# Patient Record
Sex: FEMALE | Race: WHITE
[De-identification: names, ages, dates, MRNs, and addresses within clinical notes are randomized per-mention and may not be internally consistent; named-entity substitution may affect disease eponyms.]

---

## 2020-03-30 ENCOUNTER — HOSPITAL ENCOUNTER (EMERGENCY)
Dept: HOSPITAL 11 - JP.ED | Age: 45
Discharge: HOME | End: 2020-03-30
Payer: MEDICAID

## 2020-03-30 DIAGNOSIS — E03.9: ICD-10-CM
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
## 2020-03-30 DIAGNOSIS — Z79.899: ICD-10-CM

## 2020-03-30 DIAGNOSIS — S92.012A: Primary | ICD-10-CM

## 2020-03-30 PROCEDURE — 73700 CT LOWER EXTREMITY W/O DYE: CPT

## 2020-03-30 PROCEDURE — 99283 EMERGENCY DEPT VISIT LOW MDM: CPT

## 2020-03-30 PROCEDURE — 73610 X-RAY EXAM OF ANKLE: CPT

## 2020-03-30 PROCEDURE — 96372 THER/PROPH/DIAG INJ SC/IM: CPT

## 2020-03-30 PROCEDURE — 29515 APPLICATION SHORT LEG SPLINT: CPT

## 2020-03-30 NOTE — CR
Ankle Min 3V Lt

 

CLINICAL HISTORY: Fall, injury

 

FINDINGS: There is a minimally displaced fracture in the coronal plane through

the posterior third of the calcaneus with some flattening of the angle. Talus

appears intact. Ankle mortise is symmetric.

 

Impression: Calcaneal fracture

## 2020-03-30 NOTE — CRLCT
HISTORY:



Fall. Calcaneus fracture.



TECHNIQUE:



CT left foot without contrast.



COMPARISON:



Radiographs same day.



FINDINGS:



Acute comminuted fracture of the calcaneus. Fracture involves the posterior 

facet with a sagittal fracture plane dividing the posterior facet into 

medial and lateral halves. Fragment comprising the lateral half of the 

posterior facet is impacted and rotated. Relationship between the medial 

aspect of the posterior facet and the posterior facet of the talus is 

maintained. Fracture involves the anterior aspect of the sustentaculum 

rod. Middle facet articular surface is intact. Up to 7 mm medial to 

lateral displacement between the major calcaneal tuberosity fragment and 

the fragment containing the medial half of the posterior facet in the 

sustentaculum rod (axial series 3, image 50). Fracture extends obliquely 

through the anterior aspect of the calcaneus extending just lateral to the 

articular surface for the cuboid. Articular surface for the cuboid is 

intact. No talus fracture. No fracture in the midfoot or forefoot. Ankle 

mortise is congruent. No dislocation. Moderate osteoarthritis of the 1st 

MTP joint. Severe degenerative changes at the articulations of the hallux 

sesamoids and 1st metatarsal. Acute hemorrhage and small amount of 

extraosseous marrow fat in the flexor hallucis longus tendon sheath. Fat 

stranding in the hindfoot and midfoot.



IMPRESSION:



1. Acute comminuted, displaced, and depressed fracture of the calcaneus. 



2. Moderate osteoarthritis of the 1st MTP joint. Severe degenerative 

changes at the articulations of the hallux sesamoids and 1st metatarsal.



Please note that all CT scans at this facility use dose modulation, 

iterative reconstruction, and/or weight-based dosing when appropriate to 

reduce radiation dose to as low as reasonably achievable.



Dictated by Javier Eisenberg MD @ Mar 30 2020  3:46PM



Signed by Dr. Javier Eisenberg @ Mar 30 2020  4:03PM

## 2020-03-30 NOTE — EDM.PDOC
ED HPI GENERAL MEDICAL PROBLEM





- General


Chief Complaint: Lower Extremity Injury/Pain


Stated Complaint: LEFT ANKLE INJURY


Time Seen by Provider: 03/30/20 13:35


Source of Information: Reports: Patient, Family, RN Notes Reviewed


History Limitations: Reports: No Limitations





- History of Present Illness


INITIAL COMMENTS - FREE TEXT/NARRATIVE: 





44-year-old female presents emergency department today with a left ankle injury

, she injured herself last night when she accidentally fell off a stepstool she 

is unsure of the mechanism of action she cannot bear weight she treated with 

ice right away but she does have edema as well as ecchymosis around the ankle


  ** Left Ankle


Pain Score (Numeric/FACES): 4





- Related Data


 Allergies











Allergy/AdvReac Type Severity Reaction Status Date / Time


 


No Known Allergies Allergy   Verified 03/30/20 13:16











Home Meds: 


 Home Meds





ClonazePAM [KlonoPIN] 0.5 mg PO ASDIRECTED PRN 03/30/20 [History]


Levothyroxine [Synthroid] 50 mcg PO DAILY 03/30/20 [History]


Sertraline [Zoloft] 150 mg PO DAILY 03/30/20 [History]


buPROPion [Wellbutrin] 100 mg PO BEDTIME 03/30/20 [History]


buPROPion [Wellbutrin] 200 mg PO DAILY 03/30/20 [History]











Past Medical History


Cardiovascular History: Reports: High Cholesterol


OB/GYN History: Reports: Pregnancy


Other OB/GYN History: oviarian polyp removed


Endocrine/Metabolic History: Reports: Hypothyroidism





- Past Surgical History


Oncologic Surgical History: Reports: Lumpectomy





Social & Family History





- Tobacco Use


Smoking Status *Q: Never Smoker





- Caffeine Use


Caffeine Use: Reports: Coffee





- Recreational Drug Use


Recreational Drug Use: No





Review of Systems





- Review of Systems


Review Of Systems: See Below


Constitutional: Reports: No Symptoms


Musculoskeletal: Reports: Joint Pain (Ankle pain)


Skin: Reports: Bruising





ED EXAM, GENERAL





- Physical Exam


Exam: See Below


Free Text/Narrative:: 





Examination of the left ankle I do appreciate significant edema predominantly 

on the lateral malleolus there is bruising around the lateral malleolus she 

will not tolerate much of an exam secondary to pain pedal pulses +2 she cannot 

bear weight





Course





- Vital Signs


Last Recorded V/S: 


 Last Vital Signs











Temp  98.2 F   03/30/20 13:25


 


Pulse  78   03/30/20 13:15


 


Resp  17   03/30/20 13:15


 


BP  145/91 H  03/30/20 13:15


 


Pulse Ox  96   03/30/20 13:15














- Orders/Labs/Meds


Meds: 


Medications














Discontinued Medications














Generic Name Dose Route Start Last Admin





  Trade Name Lesly  PRN Reason Stop Dose Admin


 


Fentanyl  50 mcg  03/30/20 14:48  03/30/20 14:51





  Sublimaze  IM  03/30/20 14:49  50 mcg





  ONETIME ONE   Administration





     





     





     





     


 


Ketorolac Tromethamine  60 mg  03/30/20 13:37  03/30/20 13:41





  Toradol  IM  03/30/20 13:38  60 mg





  ONETIME ONE   Administration





     





     





     





     














- Re-Assessments/Exams


Free Text/Narrative Re-Assessment/Exam: 





03/30/20 14:49


Discussed the case with Dr. Leon at 1440 recommend CT scan posterior 

splint with extra padding crutches follow-up in his clinic in 1 week





Departure





- Departure


Time of Disposition: 16:25


Disposition: Home, Self-Care 01


Condition: Fair


Clinical Impression: 


Calcaneus fracture, left


Qualifiers:


 Encounter type: initial encounter Calcaneus location: body Fracture type: 

closed Fracture alignment: displaced Qualified Code(s): S92.012A - Displaced 

fracture of body of left calcaneus, initial encounter for closed fracture








- Discharge Information


Referrals: 


PCP,None [Primary Care Provider] - 


Forms:  ED Department Discharge


Additional Instructions: 


Please call to the Ortho clinic tomorrow morning for an appointment time in 1 

week with Dr. Leon use of Percocet as needed for pain control continue to 

use the crutches and nonweightbearing





Sepsis Event Note





- Evaluation


Sepsis Screening Result: No Definite Risk





- Focused Exam


Vital Signs: 


 Vital Signs











  Temp Pulse Resp BP Pulse Ox


 


 03/30/20 13:25  98.2 F    


 


 03/30/20 13:15   78  17  145/91 H  96


 


 03/30/20 13:11   78  17  145/91 H  96











Date Exam was Performed: 03/30/20


Time Exam was Performed: 16:23





- Assessment/Plan


Plan: 





Assessment





Acuity = acute





Site and laterality = left calcaneus fracture closed





Etiology  = secondary to trauma





Manifestations = none





Location of injury =  Home





Lab values = CT he describes a fracture as well as plain film it is comminuted 

displaced and depressed





Plan


Discussed case Dr. Rodriges he will see her in 1 week she is placed in a 

posterior splint and crutches Percocet 5/325 1 tab p.o. 3 times daily PRN total 

#20

















 This note was dictated using dragon voice recognition software please call 

with any questions on syntax or grammar.

## 2020-04-15 ENCOUNTER — HOSPITAL ENCOUNTER (OUTPATIENT)
Dept: HOSPITAL 11 - JP.SDS | Age: 45
Discharge: HOME | End: 2020-04-15
Attending: ORTHOPAEDIC SURGERY
Payer: MEDICAID

## 2020-04-15 DIAGNOSIS — E78.00: ICD-10-CM

## 2020-04-15 DIAGNOSIS — F41.9: ICD-10-CM
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
## 2020-04-15 DIAGNOSIS — S92.062A: Primary | ICD-10-CM

## 2020-04-15 DIAGNOSIS — Z79.899: ICD-10-CM

## 2020-04-15 DIAGNOSIS — E03.9: ICD-10-CM

## 2020-04-15 PROCEDURE — C1713 ANCHOR/SCREW BN/BN,TIS/BN: HCPCS

## 2020-04-15 NOTE — OR
DATE OF PROCEDURE:  04/15/2020

 

SURGEON:  Rivera Leon MD

 

PREOPERATIVE DIAGNOSIS:  Comminuted intra-articular left calcaneus fracture.

 

POSTOPERATIVE DIAGNOSIS:  Comminuted displaced intra-articular left calcaneus 
fracture.

 

PROCEDURE:  Open reduction and internal fixation of left calcaneus.

 

ANESTHESIA:  General.

 

INDICATIONS:  Rita is a 44-year-old female who sustained a fall directly onto 
her left heel

resulting in an intra-articular fracture with depression and displacement.  She 
now presents

for open reduction and internal fixation.  It has been approximately 2 weeks 
since the

injury and her swelling has subsided significantly.  Risks, benefits, and 
potential

complications of the procedure were discussed with the patient and her .

 

DESCRIPTION OF PROCEDURE:  After adequate anesthesia was obtained, the patient 
was placed in

lateral decubitus position and secured with a bean-bag positioner.  Tourniquet 
was placed

about the left leg.  Left leg was then prepped and draped in a sterile fashion.
  Leg was

held in elevation and tourniquet inflated to 300 mmHg pressure.  Extensile 
lateral incision

was planned and carried out sharply dissecting directly down onto bone of the 
calcaneus and

raising a full-thickness subperiosteal flap.  Peroneal tendons were identified.
  P[place]ley was

divided to allow mobilization.  Once the flap was mobilized, a K-wire was 
advanced into the

shaft of the fibula and bent for a retractor.  A 2nd K-wire was placed into the 
talar neck

and again used for retractor.  Fracture of the lateral wall was identified.  
This fragment

was elevated off the depressed articular fragment and kept moist on the back 
table.  The

depressed articular fragment was significantly impacted and took a bit of work 
to free it to

allow reduction.  A Shantz screw was placed into the posterior aspect of the 
tuberosity and

used to manipulate the fracture.  Traction was applied.  [place] elevator was 
placed across

the fracture into the medial wall and with traction and mobilization with the 
Schanz screw

as a joystick, reduction of the medial wall was attempted.  The medial 
fragments still overlapped so a lamina  was used to obtain length. A K-
wire was than placed along the medial side.  The

articular fragment was then reduced into position and held with a K-wire and 
position was

confirmed using fluoroscopy.  Once the height and position of the fracture was 
re-

established, a significant cavity was left within the body, and this was packed 
with crushed

cancellous bone.  The lateral wall fragment was then replaced and a calcaneal 
plate was

selected.  This was pinned in position and the position was confirmed with 
fluoroscopy.

This was then secured with the 2.7 mm locking screws.  Once fixation had been 
obtained, the

wound was irrigated.  The retracting pins were removed along with the temporary 
K-wires for

fixation.  The flap was then repaired full-thickness with 0 Vicryl in 
interrupted fashion

using pop-off sutures.  Once all sutures had been placed with good bite of the

periosteum, the sutures were tied, and this provided excellent approximation of 
the flap.  A

running 3-0 Monocryl was then placed subcuticularly.  Steri-Strips were 
applied.  Wound was

infiltrated with Marcaine and a sterile dressing was placed.

The patient was then placed into a well-padded plaster AO splint in neutral 
position.  She

tolerated the procedure well.  There were no complications.  She was taken from 
the

operating room in stable condition.

 

 

 

 

Rivera Leon MD

DD:  04/15/2020 15:43:40

DT:  04/15/2020 18:20:26

Job #:  [id number]

[email or web address]

## 2021-01-11 ENCOUNTER — HOSPITAL ENCOUNTER (OUTPATIENT)
Dept: HOSPITAL 11 - JP.SDS | Age: 46
Discharge: HOME | End: 2021-01-11
Attending: ORTHOPAEDIC SURGERY
Payer: MEDICAID

## 2021-01-11 DIAGNOSIS — T84.84XA: ICD-10-CM

## 2021-01-11 DIAGNOSIS — M19.172: Primary | ICD-10-CM

## 2021-01-11 PROCEDURE — 85027 COMPLETE CBC AUTOMATED: CPT

## 2021-01-11 PROCEDURE — 80053 COMPREHEN METABOLIC PANEL: CPT

## 2021-01-11 PROCEDURE — C1713 ANCHOR/SCREW BN/BN,TIS/BN: HCPCS

## 2021-01-11 PROCEDURE — 76000 FLUOROSCOPY <1 HR PHYS/QHP: CPT

## 2021-01-11 PROCEDURE — 81025 URINE PREGNANCY TEST: CPT

## 2021-01-11 PROCEDURE — 36415 COLL VENOUS BLD VENIPUNCTURE: CPT

## 2021-01-11 PROCEDURE — C1769 GUIDE WIRE: HCPCS

## 2021-01-11 PROCEDURE — 28725 ARTHRODESIS SUBTALAR: CPT

## 2021-01-11 PROCEDURE — 20680 REMOVAL OF IMPLANT DEEP: CPT

## 2021-01-25 NOTE — OR
DATE OF PROCEDURE:  01/11/2021

 

SURGEON:  Rivera Leon MD

 

PREOPERATIVE DIAGNOSES:

1. Painful hardware, left calcaneus.

2. Post-traumatic arthritis, subtalar joint.

 

POSTOPERATIVE DIAGNOSES:

1. Painful hardware, left calcaneus.

2. Post-traumatic arthritis, subtalar joint.

 

PROCEDURES:

1. Removal of hardware, left calcaneus.

2. Subtalar arthrodesis.

 

ASSISTANT:  WES Cazares

 

ANESTHESIA:  General.

 

INDICATIONS:  Rita is a 45-year-old female with a history of comminuted 
displaced intra-

articular fracture of the left calcaneus.  She previously underwent open 
reduction and

internal fixation.  She has gone on to heal the fracture and is having 
persistent pain in

the lateral aspect of the heel and calcaneus as well as with weightbearing.  
Some

impingement of the peroneal tendons and tenderness over the hardware.  She 
wishes to have

the hardware removed.  We had a discussion regarding post-traumatic arthritis 
and the need for

subtalar arthrodesis, which would be likely at some point in the future if she 
did not

proceed with it now.  Due to the evidence of some post-traumatic changes, pain 
with

weightbearing and increased likelihood of surgical wound complications with 
multiple

surgeries in this area.  A decision was made to proceed with the arthrodesis at 
this time.

Risks, benefits, potential complications were discussed.

 

DESCRIPTION OF PROCEDURE:  After adequate anesthesia was obtained, a tourniquet 
was placed

about the left upper thigh.  Left leg was prepped and draped in a sterile 
fashion with the

patient in a lateral decubitus position and secured with the beanbag positioner.
 Leg was

prepped and draped in a sterile fashion.  Exsanguinated and tourniquet inflated 
to 300 mmHg

pressure.  The patient had a fairly significant keloid of the scar in the 
lateral aspect of

the heel and this was excised.  Incision carried down to the calcaneus and a 
full-thickness

flap was elevated exposing the calcaneal plate.  Plate was cleared of scar 
tissue and screws

were removed without difficulty.  The plate was then removed.  A K-wire was 
placed into the

fibula and bent and used as a retractor.  A 2nd K-wire was placed into the talar
neck, again

bent and used as retraction.  Subtalar joint was identified.  Capsule was 
excised sharply

and using a combination of curettes, rongeurs, and osteotomes, articular 
cartilage was

removed from both surfaces of the subtalar joint.  It was then thoroughly 
irrigated.  After

the decortication, crushed cancellous bone graft was packed into some of the 
recesses in the

joint.  The joint was then compressed and held in just a slight valgus position 
and a guide

pin was placed through a percutaneous stab wound in the heel driven up through 
the body of

the calcaneus and into the talar neck.  Position was confirmed using 
fluoroscopy.  This was

measured and then drilled.  A 7.3 partially threaded cannulated screw was then 
placed over

the guide pin.  This had excellent purchase and compression.  A guide pin was 
removed and

final position of the screw was assessed with fluoroscopy.  Good compression was
obtained

along the subtalar joint.  Additional bone graft was packed in along the lateral
edge of the

joint.  Wound was irrigated.  It was then closed using 2-0 Vicryl in the dermal 
layer and 3-

0 nylon in interrupted mattress fashion on the skin.  This provided excellent 
approximation

without tension on the incision.  The tourniquet was released and a well-padded 
light

compression dressing was applied.  The patient tolerated the procedure very 
well.  There

were no complications, taken from the operating room in stable condition.

 

 

 

 

Rivera Leon MD

DD:  01/25/2021 10:53:03

DT:  01/25/2021 14:05:51

Job #:  [id number]

FLOYD